# Patient Record
Sex: FEMALE | Race: WHITE | NOT HISPANIC OR LATINO | Employment: OTHER | URBAN - METROPOLITAN AREA
[De-identification: names, ages, dates, MRNs, and addresses within clinical notes are randomized per-mention and may not be internally consistent; named-entity substitution may affect disease eponyms.]

---

## 2017-06-26 NOTE — PATIENT DISCUSSION
CHOROIDAL NEVUS, OS: UNCHANGED COMPARED TO PREVIOUS PHOTO. PRESCRIBED UV PROTECTION TO MINIMIZE UV EXPOSURE. RETURN FOR FOLLOW-UP AS SCHEDULED.

## 2017-08-17 NOTE — PATIENT DISCUSSION
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 RECOMMENDED ATS THROUGHOUT THE DAY AND CELLUVISC QHS. D/C CL WEAR UNTIL EYES WNL. RTC AS SCHEDULED/RTC SOONER IF SYMPTOMS INCREASE/PERSIST.

## 2017-08-17 NOTE — PATIENT DISCUSSION
New Prescription: Zylet (tobramycin-lotepred): drops,suspension: 0.3-0.5% 1 drop three times a day into both eyes 08-

## 2018-12-13 ENCOUNTER — NEW PATIENT EMERGENCY (OUTPATIENT)
Dept: URBAN - METROPOLITAN AREA CLINIC 43 | Facility: CLINIC | Age: 48
End: 2018-12-13

## 2018-12-13 DIAGNOSIS — H04.123: ICD-10-CM

## 2018-12-13 PROCEDURE — 92002 INTRM OPH EXAM NEW PATIENT: CPT

## 2018-12-13 ASSESSMENT — VISUAL ACUITY
OD_SC: 20/20-1
OS_SC: 20/20

## 2018-12-13 ASSESSMENT — TONOMETRY
OD_IOP_MMHG: 16
OS_IOP_MMHG: 16

## 2019-01-30 NOTE — PATIENT DISCUSSION
POSTERIOR VITREOUS DETACHMENT, OD: NO HOLES. NO TEARS. RETINAL DETACHMENT WARNINGS DISCUSSED. RETURN FOR FOLLOW-UP AS SCHEDULED.

## 2019-01-30 NOTE — PATIENT DISCUSSION
DRY EYE SYNDROME OU: RX ARTIFICAL TEARS AS NEEDED TO INCREASE COMFORT OU. IF SYMPTOMS PERSIST CONSIDER PUNTAL PLUGS.

## 2019-08-06 NOTE — PATIENT DISCUSSION
- Removed today at the slit lamp V-Y Plasty Text: The defect edges were debeveled with a #15 scalpel blade.  Given the location of the defect, shape of the defect and the proximity to free margins an V-Y advancement flap was deemed most appropriate.  Using a sterile surgical marker, an appropriate advancement flap was drawn incorporating the defect and placing the expected incisions within the relaxed skin tension lines where possible.    The area thus outlined was incised deep to adipose tissue with a #15 scalpel blade.  The skin margins were undermined to an appropriate distance in all directions utilizing iris scissors.

## 2019-12-13 ENCOUNTER — ESTABLISHED COMPREHENSIVE EXAM (OUTPATIENT)
Dept: URBAN - METROPOLITAN AREA CLINIC 43 | Facility: CLINIC | Age: 49
End: 2019-12-13

## 2019-12-13 DIAGNOSIS — H43.813: ICD-10-CM

## 2019-12-13 DIAGNOSIS — H04.123: ICD-10-CM

## 2019-12-13 PROCEDURE — 92015 DETERMINE REFRACTIVE STATE: CPT

## 2019-12-13 PROCEDURE — 92014 COMPRE OPH EXAM EST PT 1/>: CPT

## 2019-12-13 ASSESSMENT — TONOMETRY
OD_IOP_MMHG: 18
OS_IOP_MMHG: 18

## 2019-12-13 ASSESSMENT — VISUAL ACUITY
OS_CC: J4-
OS_SC: 20/20-1
OS_SC: J10
OD_SC: J10
OD_CC: J4
OD_SC: 20/20-1

## 2021-01-25 NOTE — PATIENT DISCUSSION
CHOROIDAL NEVUS, OS: UNCHANGED COMPARED TO PREVIOUS PHOTO DATED 1/30/2019. PRESCRIBED UV PROTECTION TO MINIMIZE UV EXPOSURE. RETURN FOR FOLLOW-UP AS SCHEDULED.

## 2022-01-26 NOTE — PATIENT DISCUSSION
UNCHANGED COMPARED TO PREVIOUS PHOTO DATED 1/30/2019. PRESCRIBED UV PROTECTION TO MINIMIZE UV EXPOSURE. RETURN FOR FOLLOW-UP AS SCHEDULED.

## 2022-03-08 ENCOUNTER — COMPREHENSIVE EXAM (OUTPATIENT)
Dept: URBAN - METROPOLITAN AREA CLINIC 43 | Facility: CLINIC | Age: 52
End: 2022-03-08

## 2022-03-08 DIAGNOSIS — H43.813: ICD-10-CM

## 2022-03-08 DIAGNOSIS — H40.013: ICD-10-CM

## 2022-03-08 DIAGNOSIS — H04.123: ICD-10-CM

## 2022-03-08 PROCEDURE — 92133 CPTRZD OPH DX IMG PST SGM ON: CPT

## 2022-03-08 PROCEDURE — 92015 DETERMINE REFRACTIVE STATE: CPT

## 2022-03-08 PROCEDURE — 92014 COMPRE OPH EXAM EST PT 1/>: CPT

## 2022-03-08 ASSESSMENT — VISUAL ACUITY
OU_SC: 20/20
OD_SC: J10
OD_SC: 20/20-2
OU_SC: J6
OS_SC: 20/25+1
OS_SC: J10

## 2022-03-08 ASSESSMENT — TONOMETRY
OS_IOP_MMHG: 23
OD_IOP_MMHG: 23

## 2022-04-20 NOTE — PROCEDURE NOTE: CLINICAL
PROCEDURE NOTE: Removal of Conjunctival FB, Superficial #1 OS. Diagnosis: Foreign Body in Conjunctival Sac. Anesthesia: Topical. Prep: Antibiotic Drops q 5min x 3. Prior to treatment, the risks/benefits/alternatives were discussed. The patient wished to proceed with procedure. Superficial conjunctival FB (contact lens) removed with forceps and cotton-tipped applicator. Globe and conjunctiva intact. Patient tolerated procedure well. There were no complications. Post procedure instructions given. Judson Greening

## 2022-11-22 ENCOUNTER — EMERGENCY VISIT (OUTPATIENT)
Dept: URBAN - METROPOLITAN AREA CLINIC 43 | Facility: CLINIC | Age: 52
End: 2022-11-22

## 2022-11-22 DIAGNOSIS — H04.123: ICD-10-CM

## 2022-11-22 PROCEDURE — 92012 INTRM OPH EXAM EST PATIENT: CPT

## 2022-11-22 RX ORDER — NEOMYCIN SULFATE, POLYMYXIN B SULFATE AND DEXAMETHASONE 3.5; 10000; 1 MG/G; [USP'U]/G; MG/G: OINTMENT OPHTHALMIC EVERY EVENING

## 2022-11-22 ASSESSMENT — TONOMETRY
OD_IOP_MMHG: 18
OS_IOP_MMHG: 17

## 2022-11-22 ASSESSMENT — VISUAL ACUITY
OS_SC: 20/20-3
OD_SC: 20/20

## 2025-03-24 ENCOUNTER — EMERGENCY VISIT (OUTPATIENT)
Age: 55
End: 2025-03-24

## 2025-03-24 DIAGNOSIS — H04.123: ICD-10-CM

## 2025-03-24 PROCEDURE — 92012 INTRM OPH EXAM EST PATIENT: CPT

## 2025-04-02 ENCOUNTER — COMPREHENSIVE EXAM (OUTPATIENT)
Age: 55
End: 2025-04-02

## 2025-04-02 DIAGNOSIS — H52.4: ICD-10-CM

## 2025-04-02 DIAGNOSIS — H43.813: ICD-10-CM

## 2025-04-02 DIAGNOSIS — H40.013: ICD-10-CM

## 2025-04-02 DIAGNOSIS — H04.123: ICD-10-CM

## 2025-04-02 PROCEDURE — 92014 COMPRE OPH EXAM EST PT 1/>: CPT

## 2025-04-02 PROCEDURE — 92133 CPTRZD OPH DX IMG PST SGM ON: CPT

## 2025-04-02 PROCEDURE — 92015 DETERMINE REFRACTIVE STATE: CPT
